# Patient Record
Sex: FEMALE | Race: WHITE
[De-identification: names, ages, dates, MRNs, and addresses within clinical notes are randomized per-mention and may not be internally consistent; named-entity substitution may affect disease eponyms.]

---

## 2018-11-07 ENCOUNTER — HOSPITAL ENCOUNTER (OUTPATIENT)
Dept: HOSPITAL 61 - SURGPAT | Age: 83
Discharge: HOME | End: 2018-11-07
Attending: SURGERY
Payer: MEDICARE

## 2018-11-07 DIAGNOSIS — K80.20: ICD-10-CM

## 2018-11-07 DIAGNOSIS — Z88.0: ICD-10-CM

## 2018-11-07 DIAGNOSIS — Z01.818: Primary | ICD-10-CM

## 2018-11-07 LAB
ALBUMIN SERPL-MCNC: 2.9 G/DL (ref 3.4–5)
ANION GAP SERPL CALC-SCNC: 15 MMOL/L (ref 6–14)
BASOPHILS # BLD AUTO: 0 X10^3/UL (ref 0–0.2)
BASOPHILS NFR BLD: 1 % (ref 0–3)
BILIRUB SERPL-MCNC: 3.5 MG/DL (ref 0.2–1)
BUN SERPL-MCNC: 28 MG/DL (ref 7–20)
CALCIUM SERPL-MCNC: 8.9 MG/DL (ref 8.5–10.1)
CHLORIDE SERPL-SCNC: 100 MMOL/L (ref 98–107)
CO2 SERPL-SCNC: 20 MMOL/L (ref 21–32)
CREAT SERPL-MCNC: 1.8 MG/DL (ref 0.6–1)
EOSINOPHIL NFR BLD: 0 X10^3/UL (ref 0–0.7)
EOSINOPHIL NFR BLD: 1 % (ref 0–3)
ERYTHROCYTE [DISTWIDTH] IN BLOOD BY AUTOMATED COUNT: 17.2 % (ref 11.5–14.5)
GFR SERPLBLD BASED ON 1.73 SQ M-ARVRAT: 26.9 ML/MIN
GLUCOSE SERPL-MCNC: 195 MG/DL (ref 70–99)
HCT VFR BLD CALC: 29.1 % (ref 36–47)
HGB BLD-MCNC: 9.9 G/DL (ref 12–15.5)
LYMPHOCYTES # BLD: 1.3 X10^3/UL (ref 1–4.8)
LYMPHOCYTES NFR BLD AUTO: 27 % (ref 24–48)
MCH RBC QN AUTO: 31 PG (ref 25–35)
MCHC RBC AUTO-ENTMCNC: 34 G/DL (ref 31–37)
MCV RBC AUTO: 90 FL (ref 79–100)
MONO #: 0.3 X10^3/UL (ref 0–1.1)
MONOCYTES NFR BLD: 6 % (ref 0–9)
NEUT #: 3.1 X10^3UL (ref 1.8–7.7)
NEUTROPHILS NFR BLD AUTO: 66 % (ref 31–73)
PLATELET # BLD AUTO: 263 X10^3/UL (ref 140–400)
POTASSIUM SERPL-SCNC: 4 MMOL/L (ref 3.5–5.1)
RBC # BLD AUTO: 3.25 X10^6/UL (ref 3.5–5.4)
SODIUM SERPL-SCNC: 135 MMOL/L (ref 136–145)
WBC # BLD AUTO: 4.8 X10^3/UL (ref 4–11)

## 2018-11-07 PROCEDURE — 36415 COLL VENOUS BLD VENIPUNCTURE: CPT

## 2018-11-07 PROCEDURE — 82247 BILIRUBIN TOTAL: CPT

## 2018-11-07 PROCEDURE — 80048 BASIC METABOLIC PNL TOTAL CA: CPT

## 2018-11-07 PROCEDURE — 85025 COMPLETE CBC W/AUTO DIFF WBC: CPT

## 2018-11-07 PROCEDURE — 82040 ASSAY OF SERUM ALBUMIN: CPT

## 2021-08-07 ENCOUNTER — HOSPITAL ENCOUNTER (EMERGENCY)
Dept: HOSPITAL 61 - ER | Age: 86
Discharge: HOME | End: 2021-08-07
Payer: MEDICARE

## 2021-08-07 VITALS — SYSTOLIC BLOOD PRESSURE: 201 MMHG | DIASTOLIC BLOOD PRESSURE: 100 MMHG

## 2021-08-07 VITALS — WEIGHT: 85.98 LBS | HEIGHT: 61 IN | BODY MASS INDEX: 16.23 KG/M2

## 2021-08-07 DIAGNOSIS — N18.9: ICD-10-CM

## 2021-08-07 DIAGNOSIS — I12.9: ICD-10-CM

## 2021-08-07 DIAGNOSIS — E78.00: ICD-10-CM

## 2021-08-07 DIAGNOSIS — N39.0: ICD-10-CM

## 2021-08-07 DIAGNOSIS — R41.82: Primary | ICD-10-CM

## 2021-08-07 DIAGNOSIS — Z88.0: ICD-10-CM

## 2021-08-07 DIAGNOSIS — Z87.891: ICD-10-CM

## 2021-08-07 LAB
% LYMPHS: 4 % (ref 24–48)
% MONOS: 8 % (ref 0–10)
% SEGS: 88 % (ref 35–66)
ALBUMIN SERPL-MCNC: 3.3 G/DL (ref 3.4–5)
ALBUMIN/GLOB SERPL: 0.9 {RATIO} (ref 1–1.7)
ALP SERPL-CCNC: 137 U/L (ref 46–116)
ALT SERPL-CCNC: 29 U/L (ref 14–59)
AMORPH SED URNS QL MICRO: PRESENT /HPF
AMPHETAMINE/METHAMPHETAMINE: (no result)
ANION GAP SERPL CALC-SCNC: 12 MMOL/L (ref 6–14)
APTT PPP: YELLOW S
AST SERPL-CCNC: 55 U/L (ref 15–37)
BACTERIA #/AREA URNS HPF: 0 /HPF
BARBITURATES UR-MCNC: (no result) UG/ML
BASOPHILS # BLD AUTO: 0 X10^3/UL (ref 0–0.2)
BASOPHILS NFR BLD: 0 % (ref 0–3)
BENZODIAZ UR-MCNC: (no result) UG/L
BILIRUB SERPL-MCNC: 1.4 MG/DL (ref 0.2–1)
BILIRUB UR QL STRIP: NEGATIVE
BUN SERPL-MCNC: 27 MG/DL (ref 7–20)
BUN/CREAT SERPL: 14 (ref 6–20)
CALCIUM SERPL-MCNC: 9.6 MG/DL (ref 8.5–10.1)
CANNABINOIDS UR-MCNC: (no result) UG/L
CHLORIDE SERPL-SCNC: 95 MMOL/L (ref 98–107)
CK SERPL-CCNC: 100 U/L (ref 26–192)
CO2 SERPL-SCNC: 23 MMOL/L (ref 21–32)
COCAINE UR-MCNC: (no result) NG/ML
CREAT SERPL-MCNC: 2 MG/DL (ref 0.6–1)
EOSINOPHIL NFR BLD: 0 % (ref 0–3)
EOSINOPHIL NFR BLD: 0 X10^3/UL (ref 0–0.7)
ERYTHROCYTE [DISTWIDTH] IN BLOOD BY AUTOMATED COUNT: 13.6 % (ref 11.5–14.5)
FIBRINOGEN PPP-MCNC: (no result) MG/DL
GFR SERPLBLD BASED ON 1.73 SQ M-ARVRAT: 23.7 ML/MIN
GLUCOSE SERPL-MCNC: 270 MG/DL (ref 70–99)
HCT VFR BLD CALC: 28.2 % (ref 36–47)
HGB BLD-MCNC: 9.7 G/DL (ref 12–15.5)
HYALINE CASTS #/AREA URNS LPF: (no result) /HPF
LYMPHOCYTES # BLD: 0.2 X10^3/UL (ref 1–4.8)
LYMPHOCYTES NFR BLD AUTO: 2 % (ref 24–48)
MAGNESIUM SERPL-MCNC: 1.8 MG/DL (ref 1.8–2.4)
MCH RBC QN AUTO: 34 PG (ref 25–35)
MCHC RBC AUTO-ENTMCNC: 34 G/DL (ref 31–37)
MCV RBC AUTO: 99 FL (ref 79–100)
METHADONE SERPL-MCNC: (no result) NG/ML
MONO #: 0.6 X10^3/UL (ref 0–1.1)
MONOCYTES NFR BLD: 7 % (ref 0–9)
NEUT #: 7.6 X10^3/UL (ref 1.8–7.7)
NEUTROPHILS NFR BLD AUTO: 90 % (ref 31–73)
NITRITE UR QL STRIP: NEGATIVE
OPIATES UR-MCNC: (no result) NG/ML
PCP SERPL-MCNC: (no result) MG/DL
PH UR STRIP: 6.5 [PH]
PLATELET # BLD AUTO: 149 X10^3/UL (ref 140–400)
PLATELET # BLD EST: ADEQUATE 10*3/UL
POTASSIUM SERPL-SCNC: 4.5 MMOL/L (ref 3.5–5.1)
PROT SERPL-MCNC: 7 G/DL (ref 6.4–8.2)
PROT UR STRIP-MCNC: >=300 MG/DL
RBC # BLD AUTO: 2.85 X10^6/UL (ref 3.5–5.4)
RBC #/AREA URNS HPF: (no result) /HPF (ref 0–2)
SODIUM SERPL-SCNC: 130 MMOL/L (ref 136–145)
UROBILINOGEN UR-MCNC: 1 MG/DL
WBC # BLD AUTO: 8.4 X10^3/UL (ref 4–11)
WBC #/AREA URNS HPF: (no result) /HPF (ref 0–4)

## 2021-08-07 PROCEDURE — 82550 ASSAY OF CK (CPK): CPT

## 2021-08-07 PROCEDURE — 87086 URINE CULTURE/COLONY COUNT: CPT

## 2021-08-07 PROCEDURE — 99285 EMERGENCY DEPT VISIT HI MDM: CPT

## 2021-08-07 PROCEDURE — 93005 ELECTROCARDIOGRAM TRACING: CPT

## 2021-08-07 PROCEDURE — 36415 COLL VENOUS BLD VENIPUNCTURE: CPT

## 2021-08-07 PROCEDURE — 85007 BL SMEAR W/DIFF WBC COUNT: CPT

## 2021-08-07 PROCEDURE — 96365 THER/PROPH/DIAG IV INF INIT: CPT

## 2021-08-07 PROCEDURE — 84484 ASSAY OF TROPONIN QUANT: CPT

## 2021-08-07 PROCEDURE — 84145 PROCALCITONIN (PCT): CPT

## 2021-08-07 PROCEDURE — 84443 ASSAY THYROID STIM HORMONE: CPT

## 2021-08-07 PROCEDURE — 80053 COMPREHEN METABOLIC PANEL: CPT

## 2021-08-07 PROCEDURE — 81001 URINALYSIS AUTO W/SCOPE: CPT

## 2021-08-07 PROCEDURE — 87040 BLOOD CULTURE FOR BACTERIA: CPT

## 2021-08-07 PROCEDURE — 96366 THER/PROPH/DIAG IV INF ADDON: CPT

## 2021-08-07 PROCEDURE — 83880 ASSAY OF NATRIURETIC PEPTIDE: CPT

## 2021-08-07 PROCEDURE — 85025 COMPLETE CBC W/AUTO DIFF WBC: CPT

## 2021-08-07 PROCEDURE — 83735 ASSAY OF MAGNESIUM: CPT

## 2021-08-07 PROCEDURE — 80307 DRUG TEST PRSMV CHEM ANLYZR: CPT

## 2021-08-07 PROCEDURE — 70450 CT HEAD/BRAIN W/O DYE: CPT

## 2021-08-07 PROCEDURE — 71045 X-RAY EXAM CHEST 1 VIEW: CPT

## 2021-08-07 NOTE — RAD
EXAM:  XR CHEST 1V 8/7/2021 1:02 PM



CLINICAL INDICATION:  Altered mental status



COMPARISON:  None



TECHNIQUE:  AP view of the chest



FINDINGS:  The heart is normal in size. The right hilum is prominent. There are surgical clips and mcconnell
ture material in the right perihilar region. There are calcified granulomas in the left lung. No foca
l consolidation, pleural effusion, or pneumothorax. Cholecystectomy clips are noted.



IMPRESSION:  

1. No acute abnormality.

2. Prominent right hilum with adjacent surgical clips and suture material.



Electronically signed by: Daysi Cameron MD (8/7/2021 1:20 PM) [id number]

## 2021-08-07 NOTE — PHYS DOC
Past Medical History


Past Medical History:  High Cholesterol, Hypertension


Additional Past Medical Histor:  BACK PAIN, "FAST HEART RATE", "KIDNEY DISEASE"


Past Surgical History:  Cholecystectomy


Additional Past Surgical Histo:  "BROKE HIP W/ HIP SX." "SKIN CA SX"


Smoking Status:  Former Smoker





General Adult


EDM:


Chief Complaint:  ALTERED MENTAL STATUS





HPI:


HPI:





Patient is a 86  year old female with a history of hypertension, high cholestero

l, who presents to the ED today to be evaluated for altered mental status.  

Patient is in the ED with a sister who states she received a phone call around 

10 AM from patient's daughter karen and was informed patient is not acting 

herself.  She went and saw patient and she was not talking like Normal self. 

Patient's sister states patient was started on a muscle relaxer 5 mg on Thursday

for back pain.  She also states patient has a redness on her face.





Review of Systems:


Review of Systems:


Constitutional:   Denies fever or chills. []


Eyes:   Denies change in visual acuity. []


HENT:   Denies nasal congestion or sore throat. [] 


Respiratory:   Denies cough or shortness of breath. [] 


Cardiovascular:   Denies chest pain or edema. [] 


GI:   Denies abdominal pain, nausea, vomiting, bloody stools or diarrhea. [] 


:  Denies dysuria. [] 


Musculoskeletal:   Denies back pain or joint pain. [] 


Integument:   Reports rash on the face


Neurologic: Reports altered mental status.  Denies headache, focal weakness or 

sensory changes. [] 


Psychiatric:  Denies depression or anxiety. []





Heart Score:


C/O Chest Pain:  N/A


Risk Factors:


Risk Factors:  DM, Current or recent (<one month) smoker, HTN, HLP, family 

history of CAD, obesity.


Risk Scores:


Score 0 - 3:  2.5% MACE over next 6 weeks - Discharge Home


Score 4 - 6:  20.3% MACE over next 6 weeks - Admit for Clinical Observation


Score 7 - 10:  72.7% MACE over next 6 weeks - Early Invasive Strategies





Allergies:


Allergies:





Allergies








Coded Allergies Type Severity Reaction Last Updated Verified


 


  Penicillins Allergy Intermediate Rash 18 Yes











Physical Exam:


PE:





Constitutional: Well developed, well nourished, no acute distress, non-toxic 

appearance. []


HENT: Normocephalic, atraumatic, bilateral external ears normal, oropharynx 

moist, no oral exudates, nose normal. []


Eyes: PERRLA, EOMI, conjunctiva normal, no discharge. [] 


Neck: Normal range of motion, no tenderness, supple, no stridor. [] 


Cardiovascular:Heart rate regular rhythm, no murmur []


Lungs & Thorax:  Bilateral breath sounds clear to auscultation []


Abdomen: Bowel sounds normal, soft, no tenderness, no masses, no pulsatile 

masses. [] 


Skin: Warm, dry, redness noted on the cheeks


Back: No tenderness, no CVA tenderness. [] 


Extremities: No tenderness, no cyanosis, no clubbing, ROM intact, no edema. [] 


Neurologic: Alert and oriented X 3, normal motor function, normal sensory 

function, no focal deficits noted.  Cranial nerves II through XII intact


Psychologic: Affect normal, judgement normal, mood normal. []





Current Patient Data:


Vital Signs:





                                   Vital Signs








  Date Time  Temp Pulse Resp B/P (MAP) Pulse Ox O2 Delivery O2 Flow Rate FiO2


 


21 12:49 98.4 83 16 227/102 (59) 97 Room Air  





 98.4       











EKG:


EK interpreted by Dr. Devries sinus rhythm heart rate 88 no STEMI





Radiology/Procedures:


Radiology/Procedures:


[]PROCEDURE: CT HEAD WO CONTRAST





EXAM: CT Head without IV contrast





CLINICAL HISTORY: Altered mental status





COMPARISON: None.





TECHNIQUE:


Routine CT of the head without contrast.





PQRS compliance statement - One or more of the following individualized dose 

reduction techniques were utilized for this study:


1.  Automated exposure control


2.  Adjustment of the mA and/or kV according to patient size


3.  Use of iterative reconstruction technique





FINDINGS:  


There is no evidence of hemorrhage, mass or extra-axial fluid collection.





Grey-white differentiation is maintained with no evidence of edema. Subcortical,

 periventricular as well as deep white matter foci of hypoattenuation likely ch

anges of chronic small vessel disease. Pleural-based calcifications are seen.





There is no mass effect or shift of the intracranial structures.





The ventricles, basilar cisterns and cortical sulci are normal in size and 

configuration for the patients stated age.





The cerebellum and brainstem are unremarkable.  





The calvarium demonstrates no evidence of fracture or focal lesion.





There is normal aeration of the visualized paranasal sinuses and mastoid air 

cells.





The visualized portions of the orbits are normal.





Atherosclerotic calcifications of the intracranial internal carotid and 

vertebral arteries is seen.





IMPRESSION:


1.  No evidence for acute intracranial process.


2.  White matter changes, likely chronic small vessel disease.





Electronically signed by: Andrew Daniel MD (2021 1:58 PM) [facility]














DICTATED and SIGNED BY:     ANDREW DANIEL MD


DATE:     21 [id number] 0


PROCEDURE: PORTABLE CHEST 1V





EXAM:  XR CHEST 1V 2021 1:02 PM





CLINICAL INDICATION:  Altered mental status





COMPARISON:  None





TECHNIQUE:  AP view of the chest





FINDINGS:  The heart is normal in size. The right hilum is prominent. There are 

surgical clips and suture material in the right perihilar region. There are 

calcified granulomas in the left lung. No focal consolidation, pleural effusion,

 or pneumothorax. Cholecystectomy clips are noted.





IMPRESSION:  


1. No acute abnormality.


2. Prominent right hilum with adjacent surgical clips and suture material.





Electronically signed by: Daysi Cameron MD (2021 1:20 PM) [id number]














DICTATED and SIGNED BY:     DAYSI CAMERON MD


DATE:     21 [id number] 0





Course & Med Decision Making:


Course & Med Decision Making


Pertinent Labs and Imaging studies reviewed. (See chart for details)





This is a 86-year-old female patient presented to the ED today to be evaluated 

for altered mental status that was noted this morning.  Patient is answering her

 orientation questions correctly but seem to be mean which the sister states 

this is not normal. Sister also states patient has a redness on the face





CT of the head is negative for any acute findings.  Chest x-ray is negative for 

any acute findings.





CBC with a normal WBC, hemoglobin 9.7 hematocrit 28.2.  Glucose 270 sodium 130, 

sodium corrected for glucose is 134, positive for UTI. Creatinine 2.0 with BUN 

of 27 hx of kidney disease





BNP is 6287, and unknown history of CHF but known history of kidney failure.  

Patient still voids.  Was put on furosemide patient  was offered admission, 

patient and sister refused, discharged on Cipro first dose given in the ED for 

UTI. Instructed to follow-up with the PCP





Dragon Disclaimer:


Dragon Disclaimer:


This electronic medical record was generated, in whole or in part, using a voice

 recognition dictation system.





Departure


Departure


Impression:  


   Primary Impression:  


   Chronic kidney failure


   Qualified Codes:  N18.9 - Chronic kidney disease, unspecified


   Additional Impressions:  


   Urinary tract infection


   Qualified Codes:  N39.0 - Urinary tract infection, site not specified


   AMS (altered mental status)


   Qualified Codes:  R41.82 - Altered mental status, unspecified


Disposition:  01 HOME / SELF CARE / HOMELESS


Condition:  STABLE


Referrals:  


YUE ROJAS D.O. (PCP)


follow up next week


Patient Instructions:  Altered Mental Status, Urinary Tract Infection





Additional Instructions:  


You have urinary tract infection, please take the prescribed antibiotics until 

completed. We will also put you on furosemide for the extra fluid showing up on 

your work-up. Take it as ordered and follow-up with your primary care doctor 

next week


Scripts


Furosemide (FUROSEMIDE) 20 Mg Tablet


1 TAB PO DAILY, #4 TAB 1 Refill


   Prov: MARQUES VIDALES         21 


Ciprofloxacin Hcl (CIPRO) 250 Mg Tablet


1 TAB PO BID for 7 Days, #14 TAB 0 Refills


   Prov: MARQUES VIDALES         21











MARQUES VIDALES             Aug 7, 2021 13:08

## 2021-08-07 NOTE — RAD
EXAM: CT Head without IV contrast



CLINICAL HISTORY: Altered mental status



COMPARISON: None.



TECHNIQUE:

Routine CT of the head without contrast.



PQRS compliance statement - One or more of the following individualized dose reduction techniques wer
e utilized for this study:

1.  Automated exposure control

2.  Adjustment of the mA and/or kV according to patient size

3.  Use of iterative reconstruction technique



FINDINGS:  

There is no evidence of hemorrhage, mass or extra-axial fluid collection.



Grey-white differentiation is maintained with no evidence of edema. Subcortical, periventricular as w
ell as deep white matter foci of hypoattenuation likely changes of chronic small vessel disease. Pleu
ral-based calcifications are seen.



There is no mass effect or shift of the intracranial structures.



The ventricles, basilar cisterns and cortical sulci are normal in size and configuration for the rashaad
ents stated age.



The cerebellum and brainstem are unremarkable.  



The calvarium demonstrates no evidence of fracture or focal lesion.



There is normal aeration of the visualized paranasal sinuses and mastoid air cells.



The visualized portions of the orbits are normal.



Atherosclerotic calcifications of the intracranial internal carotid and vertebral arteries is seen.



IMPRESSION:

1.  No evidence for acute intracranial process.

2.  White matter changes, likely chronic small vessel disease.



Electronically signed by: Andrew Daniel MD (8/7/2021 1:58 PM) [facility]

## 2021-08-08 NOTE — EKG
[facility]

              [address] [place] [address]

Test Date:    2021               Test Time:    13:15:21

Pat Name:     YAQUELIN MICHAEL          Department:   

Patient ID:   [id number]           Room:          

Gender:       F                        Technician:   

:          [id number]               Requested By: MARQUES VIDALES

Order Number: [id number]           Reading MD:     

                                 Measurements

Intervals                              Axis          

Rate:         88                       P:            20

NJ:           152                      QRS:          -16

QRSD:         76                       T:            64

QT:           344                                    

QTc:          420                                    

                           Interpretive Statements

SINUS RHYTHM

LEFTWARD AXIS

NO SPECIFIC ECG ABNORMALITIES

RI6.02

No previous ECG available for comparison